# Patient Record
Sex: FEMALE
[De-identification: names, ages, dates, MRNs, and addresses within clinical notes are randomized per-mention and may not be internally consistent; named-entity substitution may affect disease eponyms.]

---

## 2018-10-11 ENCOUNTER — HOSPITAL ENCOUNTER (OUTPATIENT)
Dept: HOSPITAL 88 - ER | Age: 48
Setting detail: OBSERVATION
LOS: 1 days | Discharge: HOME | End: 2018-10-12
Attending: INTERNAL MEDICINE | Admitting: INTERNAL MEDICINE
Payer: COMMERCIAL

## 2018-10-11 VITALS — SYSTOLIC BLOOD PRESSURE: 153 MMHG | DIASTOLIC BLOOD PRESSURE: 70 MMHG

## 2018-10-11 VITALS — WEIGHT: 205 LBS | HEIGHT: 60 IN | BODY MASS INDEX: 40.25 KG/M2

## 2018-10-11 DIAGNOSIS — I10: ICD-10-CM

## 2018-10-11 DIAGNOSIS — R07.89: Primary | ICD-10-CM

## 2018-10-11 DIAGNOSIS — E66.01: ICD-10-CM

## 2018-10-11 DIAGNOSIS — M79.7: ICD-10-CM

## 2018-10-11 DIAGNOSIS — Z88.7: ICD-10-CM

## 2018-10-11 DIAGNOSIS — M06.9: ICD-10-CM

## 2018-10-11 LAB
ALBUMIN SERPL-MCNC: 4.2 G/DL (ref 3.5–5)
ALBUMIN/GLOB SERPL: 1.1 {RATIO} (ref 0.8–2)
ALP SERPL-CCNC: 74 IU/L (ref 40–150)
ALT SERPL-CCNC: 33 IU/L (ref 0–55)
ANION GAP SERPL CALC-SCNC: 17 MMOL/L (ref 8–16)
BASOPHILS # BLD AUTO: 0 10*3/UL (ref 0–0.1)
BASOPHILS NFR BLD AUTO: 0.3 % (ref 0–1)
BUN SERPL-MCNC: 12 MG/DL (ref 7–26)
BUN/CREAT SERPL: 17 (ref 6–25)
CALCIUM SERPL-MCNC: 9.6 MG/DL (ref 8.4–10.2)
CHLORIDE SERPL-SCNC: 103 MMOL/L (ref 98–107)
CK MB SERPL-MCNC: 0.4 NG/ML (ref 0–5)
CK SERPL-CCNC: 58 IU/L (ref 29–168)
CO2 SERPL-SCNC: 21 MMOL/L (ref 22–29)
DEPRECATED NEUTROPHILS # BLD AUTO: 5.2 10*3/UL (ref 2.1–6.9)
EGFRCR SERPLBLD CKD-EPI 2021: > 60 ML/MIN (ref 60–?)
EOSINOPHIL # BLD AUTO: 0.1 10*3/UL (ref 0–0.4)
EOSINOPHIL NFR BLD AUTO: 0.7 % (ref 0–6)
ERYTHROCYTE [DISTWIDTH] IN CORD BLOOD: 14 % (ref 11.7–14.4)
GLOBULIN PLAS-MCNC: 3.9 G/DL (ref 2.3–3.5)
GLUCOSE SERPLBLD-MCNC: 108 MG/DL (ref 74–118)
HCT VFR BLD AUTO: 40.1 % (ref 34.2–44.1)
HGB BLD-MCNC: 12.9 G/DL (ref 12–16)
LYMPHOCYTES # BLD: 2.9 10*3/UL (ref 1–3.2)
LYMPHOCYTES NFR BLD AUTO: 33 % (ref 18–39.1)
MCH RBC QN AUTO: 27.2 PG (ref 28–32)
MCHC RBC AUTO-ENTMCNC: 32.2 G/DL (ref 31–35)
MCV RBC AUTO: 84.6 FL (ref 81–99)
MONOCYTES # BLD AUTO: 0.5 10*3/UL (ref 0.2–0.8)
MONOCYTES NFR BLD AUTO: 6.2 % (ref 4.4–11.3)
NEUTS SEG NFR BLD AUTO: 59.6 % (ref 38.7–80)
PLATELET # BLD AUTO: 273 X10E3/UL (ref 140–360)
POTASSIUM SERPL-SCNC: 4 MMOL/L (ref 3.5–5.1)
RBC # BLD AUTO: 4.74 X10E6/UL (ref 3.6–5.1)
SODIUM SERPL-SCNC: 137 MMOL/L (ref 136–145)

## 2018-10-11 PROCEDURE — 80061 LIPID PANEL: CPT

## 2018-10-11 PROCEDURE — 99284 EMERGENCY DEPT VISIT MOD MDM: CPT

## 2018-10-11 PROCEDURE — 80053 COMPREHEN METABOLIC PANEL: CPT

## 2018-10-11 PROCEDURE — 85025 COMPLETE CBC W/AUTO DIFF WBC: CPT

## 2018-10-11 PROCEDURE — 82553 CREATINE MB FRACTION: CPT

## 2018-10-11 PROCEDURE — 93306 TTE W/DOPPLER COMPLETE: CPT

## 2018-10-11 PROCEDURE — 36415 COLL VENOUS BLD VENIPUNCTURE: CPT

## 2018-10-11 PROCEDURE — 82550 ASSAY OF CK (CPK): CPT

## 2018-10-11 PROCEDURE — 84484 ASSAY OF TROPONIN QUANT: CPT

## 2018-10-11 PROCEDURE — 93005 ELECTROCARDIOGRAM TRACING: CPT

## 2018-10-11 PROCEDURE — 84443 ASSAY THYROID STIM HORMONE: CPT

## 2018-10-11 PROCEDURE — 71045 X-RAY EXAM CHEST 1 VIEW: CPT

## 2018-10-11 RX ADMIN — FAMOTIDINE SCH MG: 10 INJECTION, SOLUTION INTRAVENOUS at 20:47

## 2018-10-11 NOTE — DIAGNOSTIC IMAGING REPORT
Examination: Single AP view of the chest.



COMPARISON: None.



INDICATION: Chest AP

     

DISCUSSION:



Lines/tubes:  None.



Lungs:  The lungs are well inflated and clear. No pneumonia or pulmonary edema.



Pleura:  No pleural effusion or pneumothorax.



Heart and mediastinum:  The heart and the mediastinum are unremarkable.



Bones and soft tissues:  No acute bony abnormalities.  



IMPRESSION:

 

1.   No acute cardiopulmonary abnormalities.





Signed by: Dr. Andrew Palisch, M.D. on 10/11/2018 7:01 PM

## 2018-10-12 VITALS — DIASTOLIC BLOOD PRESSURE: 60 MMHG | SYSTOLIC BLOOD PRESSURE: 129 MMHG

## 2018-10-12 VITALS — SYSTOLIC BLOOD PRESSURE: 169 MMHG | DIASTOLIC BLOOD PRESSURE: 74 MMHG

## 2018-10-12 VITALS — SYSTOLIC BLOOD PRESSURE: 116 MMHG | DIASTOLIC BLOOD PRESSURE: 55 MMHG

## 2018-10-12 VITALS — DIASTOLIC BLOOD PRESSURE: 80 MMHG | SYSTOLIC BLOOD PRESSURE: 165 MMHG

## 2018-10-12 VITALS — DIASTOLIC BLOOD PRESSURE: 89 MMHG | SYSTOLIC BLOOD PRESSURE: 169 MMHG

## 2018-10-12 LAB
CHOLEST SERPL-MCNC: 199 MD/DL (ref 0–199)
CHOLEST SERPL-MCNC: 202 MD/DL (ref 0–199)
CHOLEST/HDLC SERPL: 4.5 {RATIO} (ref 3–3.6)
CHOLEST/HDLC SERPL: 4.9 {RATIO} (ref 3–3.6)
CK MB SERPL-MCNC: 0.4 NG/ML (ref 0–5)
CK MB SERPL-MCNC: 0.4 NG/ML (ref 0–5)
CK SERPL-CCNC: 48 IU/L (ref 29–168)
CK SERPL-CCNC: 56 IU/L (ref 29–168)
HDLC SERPL-MSCNC: 41 MG/DL (ref 40–60)
HDLC SERPL-MSCNC: 45 MG/DL (ref 40–60)
LDLC SERPL CALC-MCNC: 130 MG/DL (ref 60–130)
LDLC SERPL CALC-MCNC: 132 MG/DL (ref 60–130)
TRIGL SERPL-MCNC: 132 MG/DL (ref 0–149)
TRIGL SERPL-MCNC: 133 MG/DL (ref 0–149)
TSH SERPL DL<=0.005 MIU/L-ACNC: 2.74 UIU/ML (ref 0.35–4.94)

## 2018-10-12 RX ADMIN — FAMOTIDINE SCH MG: 10 INJECTION, SOLUTION INTRAVENOUS at 09:01

## 2018-10-12 NOTE — CONSULTATION
DATE OF CONSULTATION:  2018 



CARDIOLOGY CONSULTATION



REASON FOR CONSULTATION:  Chest pain.  



CONSULTING PHYSICIAN:  Dr. Parmer



HPI:  This is a pleasant 48-year-old female that presented with 

uncontrolled high blood pressure.  She was sent from [facility] 

due to high blood pressure to the emergency room for further evaluation.  

In the ER, also she started complaining of left-sided chest pain on a scale 

of 4/10 that radiated to her neck.  She denied any palpitation, any 

shortness of breath, any headache, or diaphoresis.  Troponin times 2 was 

negative.  EKG showed normal sinus rhythm with no S/T abnormalities.  Chest 

x-ray showed no cardiopulmonary abnormalities.  



PAST MEDICAL HISTORY:  High blood pressure, obesity, fibromyalgia, and 

arthritis.



PAST SURGICAL HISTORY:   and hysterectomy.  



FAMILY HISTORY:  Positive for hypertension.



SOCIAL HISTORY:  No smoking.  No drinking.  She lives at home with her 

.



MEDICATIONS:  She was on atenolol, Arava, and fluticasone, and Tylenol No. 

3. 



ALLERGIES:  SHE IS ALLERGIC TO PNEUMOCOCCAL VACCINE.



REVIEW OF SYSTEMS:  Negative except as mentioned above.





PHYSICAL EXAMINATION 

VITAL SIGNS:  Temperature 97, heart rate 52, blood pressure 116/55, 

respirations 18, oxygen saturation 97% on room air. 

GENERAL:  She is awake, alert and oriented times 3.  

HEENT:  Mucous membranes moist.

NECK:  Supple.

LUNGS:  Bilateral clear to auscultation. 

CARDIOVASCULAR:  S1 and S2 present.

ABDOMEN:  Soft.

NEUROLOGICAL:  Intact.

EXTREMITIES:  With no edema.  



LABS:  Sodium 137, potassium 4, chloride 103, CO2 21, BUN 12, creatinine 

0.7, glucose 108.  White blood cells 8.69, hemoglobin 12.9, hematocrit 

40.1, and platelets 273,000.



IMPRESSION 

1.  Hypertension.

2.  Atypical chest pain.

3.  Obesity.

4.  History of fibromyalgia and arthritis.



ASSESSMENT AND PLAN

1.  Will go ahead and get serial cardiac enzymes.

2.  Pending echo to assess the LV and the valve function.

3.  Will check TSH and lipid panel.

4.  Heart rate is in the 50s.  Will go ahead and decrease atenolol from 50 

mg to 25 mg.  She has been counseled on weight reduction.



Further cardiac workup pending clinical course.





Thank you for this consultation.



DICTATED BY PRITESH DSOUZA NP

  

 





DD:  10/12/2018 08:18

DT:  10/12/2018 09:16

Job#:  [id number]

## 2018-10-16 NOTE — XMS REPORT
Patient Summary Document

                             Created on: 10/16/2018



HANDY SHARP

External Reference #: [id number]

: 1970

Sex: Female



Demographics







                          Address                   [facility] [address] [place] [address]  [phone]

 

                          Home Phone                [phone]

 

                          Preferred Language        Unknown

 

                          Marital Status            Unknown

 

                          Baptism Affiliation     Unknown

 

                          Race                      Unknown

 

                          Ethnic Group              Unknown





Author







                          [facility]                    [facility]

 

                          [facility]

 

                          Address                   Unknown

 

                          Phone                     Unavailable







Care Team Providers







                    Care Team Member Name    Role                Phone

 

                    BRITTNEY CONTEH    Unavailable         Unavailable







Problems

This patient has no known problems.



Allergies, Adverse Reactions, Alerts

This patient has no known allergies or adverse reactions.



Medications

This patient has no known medications.



Results







           Test Description    Test Time    Test Comments    Text Results    Atomic Results    Result

 Comments

 

                CHEST SINGLE (PORTABLE)    2018-10-11 18:57:00                        [address] [address]      Patient 
Name: HANDY SHARP   MR #: [id number]    : 1970 Age/Sex: 48/F  Acct 
#: [id number] Req #: [id number]  Adm Physician:     Ordered by: MAKSIM CONTEH MD  Report #: [id number]   Location: ER  Room/Bed:     
_____________________________________________________________________________
______________________    Procedure: [id number] DX/CHEST SINGLE (PORTABLE)  Exam 
Date: 10/11/18                            Exam Time: 1845       REPORT STATUS: 
Signed    Examination: Single AP view of the chest.      COMPARISON: None.      
INDICATION: Chest AP           DISCUSSION:      Lines/tubes:  None.      Lungs: 
The lungs are well inflated and clear. No pneumonia or pulmonary edema.      
Pleura:  No pleural effusion or pneumothorax.      Heart and mediastinum:  The 
heart and the mediastinum are unremarkable.      Bones and soft tissues:  No 
acute bony abnormalities.        IMPRESSION:       1.   No acute cardiopulmonary
abnormalities.         Signed by: Dr. Andrew Palisch, M.D. on 10/11/2018 7:01 PM
       Dictated By: ANDREW R PALISCH MD  Electronically Signed By: ANDREW R PALISCH MD on 10/11/18 1901  Transcribed By: DIDI on 10/11/18 1901       COPY
TO:   MAKSIM CONTEH MD

## 2018-12-10 NOTE — DISCHARGE SUMMARY
FINAL DIAGNOSIS:  Atypical chest pain, no acute myocardial infarction. 



SECONDARY DIAGNOSES

1. Uncontrolled hypertension, better. 

2. Morbid obesity.



CONSULTANT:  Dr. Palmer, cardiology.



PROCEDURES/STUDIES PERFORMED:  Echocardiogram, which was benign.



HISTORY:  Per H&P.



HOSPITAL COURSE:  Patient was evaluated by cardiology.  Since she did not 

have acute myocardial infarction with 3 negative troponins, we felt 

comfortable for her to be discharged home.  She will follow up with 

[facility] for outpatient stress test.  I have informed her 

[facility] PCP about this as far as her uncontrolled hypertension.  

Patient was put on losartan instead of lisinopril.  Her blood pressure got 

better.



CONDITION ON DISCHARGE:  Stable.



DISCHARGE MEDICATIONS:  Please see medication reconciliation form. 





 _________________________________

MIGEL SHARMA M.D.



DD:  12/10/2018 09:47   DT:  12/10/2018 10:08

Job#:  [id number]



cc:KATE NAVARRO M.D., [facility]

## 2019-07-23 ENCOUNTER — HOSPITAL ENCOUNTER (OUTPATIENT)
Dept: HOSPITAL 88 - ER | Age: 49
Setting detail: OBSERVATION
LOS: 1 days | Discharge: HOME | End: 2019-07-24
Attending: INTERNAL MEDICINE | Admitting: INTERNAL MEDICINE
Payer: COMMERCIAL

## 2019-07-23 VITALS — DIASTOLIC BLOOD PRESSURE: 67 MMHG | SYSTOLIC BLOOD PRESSURE: 154 MMHG

## 2019-07-23 VITALS — HEIGHT: 60 IN | BODY MASS INDEX: 40.25 KG/M2 | WEIGHT: 205 LBS

## 2019-07-23 DIAGNOSIS — R07.89: Primary | ICD-10-CM

## 2019-07-23 DIAGNOSIS — M79.7: ICD-10-CM

## 2019-07-23 DIAGNOSIS — Z88.7: ICD-10-CM

## 2019-07-23 DIAGNOSIS — I16.9: ICD-10-CM

## 2019-07-23 DIAGNOSIS — K21.9: ICD-10-CM

## 2019-07-23 DIAGNOSIS — E66.01: ICD-10-CM

## 2019-07-23 DIAGNOSIS — I10: ICD-10-CM

## 2019-07-23 LAB
ALBUMIN SERPL-MCNC: 4.3 G/DL (ref 3.5–5)
ALBUMIN/GLOB SERPL: 1 {RATIO} (ref 0.8–2)
ALP SERPL-CCNC: 87 IU/L (ref 40–150)
ALT SERPL-CCNC: 25 IU/L (ref 0–55)
ANION GAP SERPL CALC-SCNC: 14.6 MMOL/L (ref 8–16)
BASOPHILS # BLD AUTO: 0 10*3/UL (ref 0–0.1)
BASOPHILS NFR BLD AUTO: 0.4 % (ref 0–1)
BUN SERPL-MCNC: 15 MG/DL (ref 7–26)
BUN/CREAT SERPL: 21 (ref 6–25)
CALCIUM SERPL-MCNC: 9.8 MG/DL (ref 8.4–10.2)
CHLORIDE SERPL-SCNC: 101 MMOL/L (ref 98–107)
CK MB SERPL-MCNC: 0.4 NG/ML (ref 0–5)
CK MB SERPL-MCNC: 0.4 NG/ML (ref 0–5)
CK SERPL-CCNC: 42 IU/L (ref 29–168)
CK SERPL-CCNC: 48 IU/L (ref 29–168)
CO2 SERPL-SCNC: 28 MMOL/L (ref 22–29)
DEPRECATED NEUTROPHILS # BLD AUTO: 6.8 10*3/UL (ref 2.1–6.9)
EGFRCR SERPLBLD CKD-EPI 2021: > 60 ML/MIN (ref 60–?)
EOSINOPHIL # BLD AUTO: 0.1 10*3/UL (ref 0–0.4)
EOSINOPHIL NFR BLD AUTO: 1.1 % (ref 0–6)
ERYTHROCYTE [DISTWIDTH] IN CORD BLOOD: 13.7 % (ref 11.7–14.4)
GLOBULIN PLAS-MCNC: 4.1 G/DL (ref 2.3–3.5)
GLUCOSE SERPLBLD-MCNC: 125 MG/DL (ref 74–118)
HCT VFR BLD AUTO: 41.8 % (ref 34.2–44.1)
HGB BLD-MCNC: 13.5 G/DL (ref 12–16)
LYMPHOCYTES # BLD: 3.8 10*3/UL (ref 1–3.2)
LYMPHOCYTES NFR BLD AUTO: 33.4 % (ref 18–39.1)
MCH RBC QN AUTO: 27.7 PG (ref 28–32)
MCHC RBC AUTO-ENTMCNC: 32.3 G/DL (ref 31–35)
MCV RBC AUTO: 85.7 FL (ref 81–99)
MONOCYTES # BLD AUTO: 0.6 10*3/UL (ref 0.2–0.8)
MONOCYTES NFR BLD AUTO: 5 % (ref 4.4–11.3)
NEUTS SEG NFR BLD AUTO: 59.8 % (ref 38.7–80)
PLATELET # BLD AUTO: 287 X10E3/UL (ref 140–360)
POTASSIUM SERPL-SCNC: 3.6 MMOL/L (ref 3.5–5.1)
RBC # BLD AUTO: 4.88 X10E6/UL (ref 3.6–5.1)
SODIUM SERPL-SCNC: 140 MMOL/L (ref 136–145)

## 2019-07-23 PROCEDURE — 80053 COMPREHEN METABOLIC PANEL: CPT

## 2019-07-23 PROCEDURE — 70450 CT HEAD/BRAIN W/O DYE: CPT

## 2019-07-23 PROCEDURE — 85025 COMPLETE CBC W/AUTO DIFF WBC: CPT

## 2019-07-23 PROCEDURE — 83880 ASSAY OF NATRIURETIC PEPTIDE: CPT

## 2019-07-23 PROCEDURE — 93017 CV STRESS TEST TRACING ONLY: CPT

## 2019-07-23 PROCEDURE — 71046 X-RAY EXAM CHEST 2 VIEWS: CPT

## 2019-07-23 PROCEDURE — 93005 ELECTROCARDIOGRAM TRACING: CPT

## 2019-07-23 PROCEDURE — 84484 ASSAY OF TROPONIN QUANT: CPT

## 2019-07-23 PROCEDURE — 82550 ASSAY OF CK (CPK): CPT

## 2019-07-23 PROCEDURE — 36415 COLL VENOUS BLD VENIPUNCTURE: CPT

## 2019-07-23 PROCEDURE — 93306 TTE W/DOPPLER COMPLETE: CPT

## 2019-07-23 PROCEDURE — 82553 CREATINE MB FRACTION: CPT

## 2019-07-23 PROCEDURE — 99284 EMERGENCY DEPT VISIT MOD MDM: CPT

## 2019-07-23 PROCEDURE — 78452 HT MUSCLE IMAGE SPECT MULT: CPT

## 2019-07-23 RX ADMIN — Medication NR MG: at 23:16

## 2019-07-23 NOTE — NUR
consulted physician notified of laboratory results, received new orders for zofran IV and 
tylenol 100 mg iv one time only. will continue to monitor patient.

## 2019-07-23 NOTE — DIAGNOSTIC IMAGING REPORT
EXAMINATION:  CHEST 2 VIEWS    



INDICATION:      

^CP

^[id number]

^1822    



COMPARISON:  Chest radiograph 10/11/2018

     

FINDINGS:  PA and lateral views



TUBES and LINES:  None.



LUNGS:  Lungs are well inflated.  Bilateral central pulmonary vascular

congestion.   Bibasilar atelectasis.



PLEURA:  No pleural effusion or pneumothorax.



HEART AND MEDIASTINUM:  The cardiac silhouette is mildly enlarged.



BONES AND SOFT TISSUES:  No acute osseous lesion.  Soft tissues are

unremarkable.



UPPER ABDOMEN: No free air under the diaphragm. 



IMPRESSION: 

Bilateral central pulmonary vascular congestion.





Signed by: Dr. Asha Henriquez M.D. on 7/23/2019 8:06 PM

## 2019-07-23 NOTE — NUR
patient is a new admit that arrived via stretcher. patient is awake and talking. patient has 
been assisted into the bed. bed is in the lowest position and call bell is within reach. 
will continue to monitor patient.

## 2019-07-23 NOTE — DIAGNOSTIC IMAGING REPORT
History: Dizziness, hypertension.

Comparison studies: None



Technique: 

Axial images were obtained from the skull base to the vertex.  

Coronal and sagittal reconstructions obtained from the axial data.

Dose modulation, iterative reconstruction, and/or weight based adjustment of

the mA/kV was utilized to reduce the radiation dose to as low as reasonably

achievable.



Findings:



Scalp/skull: 

No abnormalities. No fractures, blastic or lytic lesions.



Extra-axial spaces: 

No masses.  No fluid collections.



Brain sulci: Appropriate for age.

Ventricles: Normal in size and configuration. No hydrocephalus.



Parenchyma: 

No abnormal densities. 

No masses, hemorrhage, acute or chronic cortical vascular insults.



Sellar/suprasellar region: No abnormalities

Craniocervical junction: Patent foramen magnum.  No Chiari one malformation.



IMPRESSION:



No abnormalities . 



Signed by: DR Son Nolasco M.D. on 7/23/2019 6:31 PM

## 2019-07-23 NOTE — NUR
spoke with consulted physician regarding STATA consult. received new orders for state ECG, 
STAT cardiac markers, and STAT echocardiogram. also received orders for prn nitroglycerin, 
and one time dose of morphine 2mg. Will continue to monitor patient.

## 2019-07-24 VITALS — SYSTOLIC BLOOD PRESSURE: 109 MMHG | DIASTOLIC BLOOD PRESSURE: 61 MMHG

## 2019-07-24 VITALS — DIASTOLIC BLOOD PRESSURE: 56 MMHG | SYSTOLIC BLOOD PRESSURE: 114 MMHG

## 2019-07-24 VITALS — SYSTOLIC BLOOD PRESSURE: 182 MMHG | DIASTOLIC BLOOD PRESSURE: 91 MMHG

## 2019-07-24 VITALS — DIASTOLIC BLOOD PRESSURE: 75 MMHG | SYSTOLIC BLOOD PRESSURE: 145 MMHG

## 2019-07-24 VITALS — SYSTOLIC BLOOD PRESSURE: 146 MMHG | DIASTOLIC BLOOD PRESSURE: 69 MMHG

## 2019-07-24 LAB
CK MB SERPL-MCNC: 0.4 NG/ML (ref 0–5)
CK MB SERPL-MCNC: 0.4 NG/ML (ref 0–5)
CK SERPL-CCNC: 36 IU/L (ref 29–168)
CK SERPL-CCNC: 36 IU/L (ref 29–168)

## 2019-07-24 RX ADMIN — Medication NR MG: at 01:36

## 2019-07-24 NOTE — OPERATIVE REPORT
DATE OF PROCEDURE:  

 

SURGEON:  Crow Glass MD

 

ACCESSION NUMBER:  [id number].

 

PROCEDURE INDICATION:  Chest pain and abnormal EKG.

 

PROCEDURE PERFORMED:  Single day technetium and Lexiscan myocardial perfusion SPECT

study. 

 

INTERPRETING PHYSICIAN:  Crow Glass M.D., Interventional Cardiology.

 

INTERPRETATION:  At rest, heart rate was 76, blood pressure 164/75.  Rest EKG shows

normal sinus rhythm with incomplete right bundle branch and nonspecific repolarization

abnormality.  After Lexiscan was administered, heart rate alpa to 100 beats per minute

and blood pressure decreased to 154/69.  There were no significant ST changes or

arrhythmias throughout stress or recovery.  Myocardial perfusion reveals normal rest and

stress perfusion and gated images demonstrate preserved left ventricular systolic

function with normal regional wall motion and left ventricular ejection fraction of 70%. 

 

CONCLUSION:  

1. Normal hemodynamic response to Lexiscan stress.

2. Normal electrocardiographic response to Lexiscan stress.

3. Normal myocardial perfusion at rest and stress.

4. Preserved left ventricular systolic function, normal regional wall motion, and left

ventricular ejection fraction of 70%. 

 

 

 

 

______________________________

Crow Glass MD

 

AFV/MODL

D:  07/24/2019 19:13:56

T:  07/24/2019 20:16:06

Job #:  [id number]

## 2019-07-24 NOTE — CONSULTATION
DATE OF CONSULTATION:  07/24/2019

 

Cardiology Consultation 

 

CONSULTING PHYSICIAN:  Crow Glass MD, Interventional Cardiology.

 

REASON FOR CONSULTATION:  Chest pain.

 

HISTORY OF PRESENT ILLNESS:  Ms. Mauro is a pleasant 49-year-old woman, who presents

for complaints of chest discomfort described as pressure-like, midsternal, constant,

lasting several hours, worse with cough and movement, unaffected by exertion.  Initial

EKG was noted to have sinus rhythm with PACs, nonspecific repolarization abnormalities,

__________ right bundle-branch block.  Cardiac enzymes have been negative.  Symptoms did

partially improve with nitroglycerin as well as with acetaminophen and morphine

throughout the night.  She feels better currently.  She describes a past history of

fibromyalgia and prior history of costochondritis.  Symptoms are somewhat atypical that

is the reason why she seek further evaluation in the hospital. 

 

REVIEW OF SYSTEMS:

A 12-system review is negative except for as noted above.

 

PAST MEDICAL HISTORY:  Significant for as noted above.  Fibromyalgia, obesity.

 

SOCIAL HISTORY:  Denies smoking, alcohol, or drugs.

 

FAMILY HISTORY:  Noncontributory.

 

PHYSICAL EXAMINATION:

VITAL SIGNS:  Temperature 96.2, heart rate 60, blood pressure 146/69, respiratory rate

16, O2 saturation 99%.  BMI 40.03. 

GENERAL:  In no acute distress, alert. 

NECK:  No JVD. 

CHEST:  Clear to auscultation. 

CARDIOVASCULAR:  Regular rate and rhythm.  Normal S1 and S2.  No S3 or S4.  No murmurs

or rubs. 

ABDOMEN:  Soft, nontender, nondistended. 

EXTREMITIES:  No cyanosis, clubbing, or edema.  Warm distal extremities.

CARDIOVASCULAR MEDICATIONS:  Reviewed.

1. Nitroglycerin 0.4 mg.

2. Aspirin 325 mg.

3. Morphine p.r.n.

 

STUDIES:  Reviewed.  Serial cardiac enzymes negative x3.  BNP within normal limits.

Sodium 140, potassium 3.6, chloride 101, bicarbonate 28.  BUN 15, creatinine 0.7.  White

blood cells 11.3, hemoglobin 13.5, platelets 287.  AST 16, ALT 25, alkaline phosphatase

87, total bilirubin 0.2. 

 

ASSESSMENT:  A 49-year-old woman with atypical chest pain, morbid obesity, history of

fibromyalgia, presents with uncontrolled hypertension. 

 

RECOMMENDATIONS:  

1. Initiate metoprolol 25 mg at bedtime p.o.

2. Echocardiogram reviewed, preserved left ventricular systolic function with mild LVH

noted. 

Stress test has been ordered and will be done this morning.  We will follow up with

reports of study once complete.  If reassuring, okay to discharge with outpatient

followup in 6 weeks from Cardiology standpoint and with primary doctor as outpatient,

Dr. Christine to consider further evaluation for other etiologies for chest discomfort. 

 

 

 

 

______________________________

Crow Glass MD

 

AFMARKY/MODL

D:  07/24/2019 13:13:59

T:  07/24/2019 14:47:50

Job #:  [id number]

## 2019-07-24 NOTE — NUR
Visit made by the Spiritual Care Department Pastoral Visitor, Regine Forbes.  Pt out of room 
and no family at bedside. A card was left at the bedside to indicate a missed visit from a 
member of the Spiritual Care team and to inform the pt and family of the availability of a 
 24 hours a day/7 days a week. A  will follow up as able. 



MODESTO CABELLO



Spiritual Care Department

O: [phone]

Pager: [phone] ([phone])

## 2019-07-24 NOTE — NUR
SPOKE TO MARIS BAKER, HE STATED, "THE STRESS TEST LOOKED FINE, SHE CAN GO HOME FROM MY 
STAND POINT, I DIDN'T KNOW THAT SHE TOOK METOPROLOL AT HOME, GIVE HER METOPROLOL 50 MG, IF 
IT IS STILL HIGH THEN SHE CAN HAVE HYDRALAZINE 10 MG IV Q4, ."

## 2019-07-24 NOTE — NUR
CALLED DR. GAMBOA'S OFFICE AGAIN REGARDING ELEVATED BLOOD PRESSURE,  STATED 
THAT HE HAS BEEN PAGED.